# Patient Record
Sex: FEMALE | Race: BLACK OR AFRICAN AMERICAN | NOT HISPANIC OR LATINO
[De-identification: names, ages, dates, MRNs, and addresses within clinical notes are randomized per-mention and may not be internally consistent; named-entity substitution may affect disease eponyms.]

---

## 2017-06-27 ENCOUNTER — RX RENEWAL (OUTPATIENT)
Age: 32
End: 2017-06-27

## 2017-07-13 ENCOUNTER — LABORATORY RESULT (OUTPATIENT)
Age: 32
End: 2017-07-13

## 2017-07-13 ENCOUNTER — APPOINTMENT (OUTPATIENT)
Dept: INTERNAL MEDICINE | Facility: CLINIC | Age: 32
End: 2017-07-13

## 2017-07-13 VITALS
DIASTOLIC BLOOD PRESSURE: 82 MMHG | WEIGHT: 265 LBS | OXYGEN SATURATION: 99 % | RESPIRATION RATE: 14 BRPM | HEIGHT: 64 IN | BODY MASS INDEX: 45.24 KG/M2 | HEART RATE: 66 BPM | SYSTOLIC BLOOD PRESSURE: 120 MMHG

## 2017-07-13 DIAGNOSIS — K21.9 GASTRO-ESOPHAGEAL REFLUX DISEASE W/OUT ESOPHAGITIS: ICD-10-CM

## 2017-07-17 ENCOUNTER — MESSAGE (OUTPATIENT)
Age: 32
End: 2017-07-17

## 2017-07-18 LAB
C TRACH RRNA SPEC QL NAA+PROBE: NORMAL
HBV SURFACE AB SER QL: NONREACTIVE
HBV SURFACE AG SER QL: NONREACTIVE
HCV AB SER QL: NONREACTIVE
HCV S/CO RATIO: 0.07 S/CO
HIV1+2 AB SPEC QL IA.RAPID: REACTIVE
N GONORRHOEA RRNA SPEC QL NAA+PROBE: NORMAL
SOURCE AMPLIFICATION: NORMAL
T PALLIDUM AB SER QL IA: NEGATIVE

## 2017-08-04 ENCOUNTER — RX RENEWAL (OUTPATIENT)
Age: 32
End: 2017-08-04

## 2018-03-01 ENCOUNTER — RX RENEWAL (OUTPATIENT)
Age: 33
End: 2018-03-01

## 2018-05-07 ENCOUNTER — APPOINTMENT (OUTPATIENT)
Dept: INTERNAL MEDICINE | Facility: CLINIC | Age: 33
End: 2018-05-07
Payer: COMMERCIAL

## 2018-05-07 VITALS
SYSTOLIC BLOOD PRESSURE: 120 MMHG | TEMPERATURE: 98.6 F | RESPIRATION RATE: 14 BRPM | HEART RATE: 69 BPM | BODY MASS INDEX: 46.95 KG/M2 | HEIGHT: 64 IN | DIASTOLIC BLOOD PRESSURE: 80 MMHG | WEIGHT: 275 LBS | OXYGEN SATURATION: 97 %

## 2018-05-07 PROCEDURE — 99395 PREV VISIT EST AGE 18-39: CPT | Mod: 25

## 2018-05-07 PROCEDURE — 36415 COLL VENOUS BLD VENIPUNCTURE: CPT

## 2018-05-07 RX ORDER — CIPROFLOXACIN HYDROCHLORIDE 500 MG/1
500 TABLET, FILM COATED ORAL
Qty: 10 | Refills: 0 | Status: DISCONTINUED | COMMUNITY
Start: 2017-03-08 | End: 2018-05-07

## 2018-05-07 RX ORDER — ACETAZOLAMIDE 500 MG/1
500 CAPSULE ORAL
Qty: 90 | Refills: 0 | Status: DISCONTINUED | COMMUNITY
Start: 2017-02-02 | End: 2018-05-07

## 2018-05-07 RX ORDER — FLUCONAZOLE 150 MG/1
150 TABLET ORAL
Qty: 1 | Refills: 0 | Status: DISCONTINUED | COMMUNITY
Start: 2017-08-04 | End: 2018-05-07

## 2018-05-07 RX ORDER — ACETAZOLAMIDE 500 MG/1
500 CAPSULE ORAL
Refills: 0 | Status: DISCONTINUED | COMMUNITY
End: 2018-05-07

## 2018-05-08 LAB
ALBUMIN SERPL ELPH-MCNC: 4.6 G/DL
ALP BLD-CCNC: 48 U/L
ALT SERPL-CCNC: 10 U/L
ANION GAP SERPL CALC-SCNC: 14 MMOL/L
AST SERPL-CCNC: 14 U/L
BASOPHILS # BLD AUTO: 0.01 K/UL
BASOPHILS NFR BLD AUTO: 0.2 %
BILIRUB SERPL-MCNC: 0.2 MG/DL
BUN SERPL-MCNC: 13 MG/DL
CALCIUM SERPL-MCNC: 9.5 MG/DL
CHLORIDE SERPL-SCNC: 104 MMOL/L
CHOLEST SERPL-MCNC: 221 MG/DL
CHOLEST/HDLC SERPL: 3.6 RATIO
CO2 SERPL-SCNC: 20 MMOL/L
CREAT SERPL-MCNC: 0.95 MG/DL
EOSINOPHIL # BLD AUTO: 0.11 K/UL
EOSINOPHIL NFR BLD AUTO: 2.3 %
GLUCOSE SERPL-MCNC: 108 MG/DL
HBA1C MFR BLD HPLC: 6.2 %
HCT VFR BLD CALC: 37.5 %
HDLC SERPL-MCNC: 61 MG/DL
HGB BLD-MCNC: 10.9 G/DL
IMM GRANULOCYTES NFR BLD AUTO: 0.2 %
LDLC SERPL CALC-MCNC: 147 MG/DL
LYMPHOCYTES # BLD AUTO: 2.07 K/UL
LYMPHOCYTES NFR BLD AUTO: 42.5 %
MAN DIFF?: NORMAL
MCHC RBC-ENTMCNC: 23.6 PG
MCHC RBC-ENTMCNC: 29.1 GM/DL
MCV RBC AUTO: 81.3 FL
MONOCYTES # BLD AUTO: 0.47 K/UL
MONOCYTES NFR BLD AUTO: 9.7 %
NEUTROPHILS # BLD AUTO: 2.2 K/UL
NEUTROPHILS NFR BLD AUTO: 45.1 %
PLATELET # BLD AUTO: 334 K/UL
POTASSIUM SERPL-SCNC: 4 MMOL/L
PROT SERPL-MCNC: 7.4 G/DL
RBC # BLD: 4.61 M/UL
RBC # FLD: 18 %
SODIUM SERPL-SCNC: 138 MMOL/L
TRIGL SERPL-MCNC: 64 MG/DL
WBC # FLD AUTO: 4.87 K/UL

## 2018-06-18 RX ORDER — FAMOTIDINE 20 MG/1
20 TABLET, FILM COATED ORAL DAILY
Qty: 30 | Refills: 5 | Status: DISCONTINUED | COMMUNITY
Start: 2018-05-14 | End: 2018-06-18

## 2018-06-18 RX ORDER — RANITIDINE 150 MG/1
150 TABLET ORAL
Qty: 60 | Refills: 11 | Status: DISCONTINUED | COMMUNITY
Start: 2018-05-07 | End: 2018-06-18

## 2018-06-29 ENCOUNTER — MESSAGE (OUTPATIENT)
Age: 33
End: 2018-06-29

## 2019-01-11 ENCOUNTER — RX RENEWAL (OUTPATIENT)
Age: 34
End: 2019-01-11

## 2019-05-17 ENCOUNTER — APPOINTMENT (OUTPATIENT)
Dept: INTERNAL MEDICINE | Facility: CLINIC | Age: 34
End: 2019-05-17
Payer: COMMERCIAL

## 2019-05-17 VITALS
HEART RATE: 68 BPM | DIASTOLIC BLOOD PRESSURE: 90 MMHG | BODY MASS INDEX: 46.78 KG/M2 | TEMPERATURE: 98.4 F | SYSTOLIC BLOOD PRESSURE: 132 MMHG | HEIGHT: 64 IN | WEIGHT: 274 LBS

## 2019-05-17 DIAGNOSIS — J30.2 OTHER SEASONAL ALLERGIC RHINITIS: ICD-10-CM

## 2019-05-17 PROCEDURE — 36415 COLL VENOUS BLD VENIPUNCTURE: CPT

## 2019-05-17 PROCEDURE — 99395 PREV VISIT EST AGE 18-39: CPT

## 2019-05-17 NOTE — PHYSICAL EXAM
[No Acute Distress] : no acute distress [Well Nourished] : well nourished [Well Developed] : well developed [Normal Sclera/Conjunctiva] : normal sclera/conjunctiva [Well-Appearing] : well-appearing [PERRL] : pupils equal round and reactive to light [EOMI] : extraocular movements intact [Normal Outer Ear/Nose] : the outer ears and nose were normal in appearance [Normal Oropharynx] : the oropharynx was normal [No JVD] : no jugular venous distention [No Lymphadenopathy] : no lymphadenopathy [Supple] : supple [Thyroid Normal, No Nodules] : the thyroid was normal and there were no nodules present [Clear to Auscultation] : lungs were clear to auscultation bilaterally [No Respiratory Distress] : no respiratory distress  [No Accessory Muscle Use] : no accessory muscle use [Normal Rate] : normal rate  [Regular Rhythm] : with a regular rhythm [Normal S1, S2] : normal S1 and S2 [No Murmur] : no murmur heard [Pedal Pulses Present] : the pedal pulses are present [No Edema] : there was no peripheral edema [Non Tender] : non-tender [Soft] : abdomen soft [Non-distended] : non-distended [No Masses] : no abdominal mass palpated [No HSM] : no HSM [Normal Bowel Sounds] : normal bowel sounds [Normal Posterior Cervical Nodes] : no posterior cervical lymphadenopathy [Normal Anterior Cervical Nodes] : no anterior cervical lymphadenopathy [No CVA Tenderness] : no CVA  tenderness [No Spinal Tenderness] : no spinal tenderness [No Joint Swelling] : no joint swelling [Grossly Normal Strength/Tone] : grossly normal strength/tone [No Rash] : no rash [Coordination Grossly Intact] : coordination grossly intact [Normal Gait] : normal gait [No Focal Deficits] : no focal deficits [Normal Affect] : the affect was normal [Normal Insight/Judgement] : insight and judgment were intact

## 2019-05-18 NOTE — HISTORY OF PRESENT ILLNESS
[FreeTextEntry1] : wellness [de-identified] : Saw a dietician two weeks ago\par has seasonal allergies.\par seeing ortho soon for right knee pain.  \par weight 269 lbs at home.  \par rpt bp 128/88 - remains on labetalol\par Going to [place] in December - needs yellow fever.    -tetanus in 2010.

## 2019-05-18 NOTE — PLAN
[FreeTextEntry1] : Jamie complete\par  labs today\par  BP elevated - Needs to lose weight - reviewed this, she milena work on diet and exercise - f/up in 3 months to monitor progress\par Continue labetalol

## 2019-05-19 LAB
ALBUMIN SERPL ELPH-MCNC: 4.6 G/DL
ALP BLD-CCNC: 42 U/L
ALT SERPL-CCNC: 14 U/L
ANION GAP SERPL CALC-SCNC: 10 MMOL/L
AST SERPL-CCNC: 15 U/L
BASOPHILS # BLD AUTO: 0.05 K/UL
BASOPHILS NFR BLD AUTO: 1 %
BILIRUB SERPL-MCNC: 0.3 MG/DL
BUN SERPL-MCNC: 16 MG/DL
CALCIUM SERPL-MCNC: 9.4 MG/DL
CHLORIDE SERPL-SCNC: 112 MMOL/L
CHOLEST SERPL-MCNC: 216 MG/DL
CHOLEST/HDLC SERPL: 4 RATIO
CO2 SERPL-SCNC: 20 MMOL/L
CREAT SERPL-MCNC: 0.92 MG/DL
EOSINOPHIL # BLD AUTO: 0.11 K/UL
EOSINOPHIL NFR BLD AUTO: 2.3 %
ESTIMATED AVERAGE GLUCOSE: 123 MG/DL
FERRITIN SERPL-MCNC: 28 NG/ML
GLUCOSE SERPL-MCNC: 100 MG/DL
HBA1C MFR BLD HPLC: 5.9 %
HCT VFR BLD CALC: 41 %
HDLC SERPL-MCNC: 54 MG/DL
HGB BLD-MCNC: 12.7 G/DL
IMM GRANULOCYTES NFR BLD AUTO: 0.4 %
IRON SATN MFR SERPL: 19 %
IRON SERPL-MCNC: 67 UG/DL
LDLC SERPL CALC-MCNC: 146 MG/DL
LYMPHOCYTES # BLD AUTO: 2.44 K/UL
LYMPHOCYTES NFR BLD AUTO: 50.3 %
MAN DIFF?: NORMAL
MCHC RBC-ENTMCNC: 26.9 PG
MCHC RBC-ENTMCNC: 31 GM/DL
MCV RBC AUTO: 86.9 FL
MONOCYTES # BLD AUTO: 0.38 K/UL
MONOCYTES NFR BLD AUTO: 7.8 %
NEUTROPHILS # BLD AUTO: 1.85 K/UL
NEUTROPHILS NFR BLD AUTO: 38.2 %
PLATELET # BLD AUTO: 250 K/UL
POTASSIUM SERPL-SCNC: 4.1 MMOL/L
PROT SERPL-MCNC: 6.9 G/DL
RBC # BLD: 4.72 M/UL
RBC # FLD: 16.4 %
SODIUM SERPL-SCNC: 142 MMOL/L
TIBC SERPL-MCNC: 353 UG/DL
TRIGL SERPL-MCNC: 80 MG/DL
UIBC SERPL-MCNC: 286 UG/DL
WBC # FLD AUTO: 4.85 K/UL

## 2019-06-03 ENCOUNTER — MESSAGE (OUTPATIENT)
Age: 34
End: 2019-06-03

## 2019-06-18 ENCOUNTER — APPOINTMENT (OUTPATIENT)
Dept: INTERNAL MEDICINE | Facility: CLINIC | Age: 34
End: 2019-06-18
Payer: COMMERCIAL

## 2019-06-18 VITALS
BODY MASS INDEX: 46.78 KG/M2 | HEART RATE: 67 BPM | RESPIRATION RATE: 14 BRPM | TEMPERATURE: 97 F | OXYGEN SATURATION: 98 % | WEIGHT: 274 LBS | SYSTOLIC BLOOD PRESSURE: 110 MMHG | HEIGHT: 64 IN | DIASTOLIC BLOOD PRESSURE: 70 MMHG

## 2019-06-18 PROCEDURE — 90471 IMMUNIZATION ADMIN: CPT

## 2019-06-18 PROCEDURE — 99212 OFFICE O/P EST SF 10 MIN: CPT | Mod: 25

## 2019-06-18 PROCEDURE — 90746 HEPB VACCINE 3 DOSE ADULT IM: CPT

## 2019-06-18 NOTE — PLAN
[FreeTextEntry1] : Hep B #1 today -will plan for complete series\par \par  Her bp remains controlled on bid labetalol

## 2019-08-02 ENCOUNTER — APPOINTMENT (OUTPATIENT)
Dept: INTERNAL MEDICINE | Facility: CLINIC | Age: 34
End: 2019-08-02
Payer: COMMERCIAL

## 2019-08-02 VITALS
TEMPERATURE: 98.9 F | RESPIRATION RATE: 14 BRPM | OXYGEN SATURATION: 98 % | HEART RATE: 78 BPM | SYSTOLIC BLOOD PRESSURE: 110 MMHG | DIASTOLIC BLOOD PRESSURE: 68 MMHG

## 2019-08-02 PROCEDURE — 90746 HEPB VACCINE 3 DOSE ADULT IM: CPT

## 2019-08-02 PROCEDURE — 99212 OFFICE O/P EST SF 10 MIN: CPT | Mod: 25

## 2019-08-02 PROCEDURE — 90471 IMMUNIZATION ADMIN: CPT

## 2019-09-11 ENCOUNTER — APPOINTMENT (OUTPATIENT)
Dept: INTERNAL MEDICINE | Facility: CLINIC | Age: 34
End: 2019-09-11

## 2019-10-13 ENCOUNTER — RX RENEWAL (OUTPATIENT)
Age: 34
End: 2019-10-13

## 2019-11-15 ENCOUNTER — APPOINTMENT (OUTPATIENT)
Dept: INTERNAL MEDICINE | Facility: CLINIC | Age: 34
End: 2019-11-15
Payer: COMMERCIAL

## 2019-11-15 VITALS
OXYGEN SATURATION: 98 % | TEMPERATURE: 98 F | WEIGHT: 270 LBS | HEART RATE: 69 BPM | BODY MASS INDEX: 46.1 KG/M2 | SYSTOLIC BLOOD PRESSURE: 110 MMHG | RESPIRATION RATE: 14 BRPM | HEIGHT: 64 IN | DIASTOLIC BLOOD PRESSURE: 70 MMHG

## 2019-11-15 DIAGNOSIS — E66.01 MORBID (SEVERE) OBESITY DUE TO EXCESS CALORIES: ICD-10-CM

## 2019-11-15 PROCEDURE — 90471 IMMUNIZATION ADMIN: CPT

## 2019-11-15 PROCEDURE — 90632 HEPA VACCINE ADULT IM: CPT

## 2019-11-15 PROCEDURE — 90472 IMMUNIZATION ADMIN EACH ADD: CPT

## 2019-11-15 PROCEDURE — 99213 OFFICE O/P EST LOW 20 MIN: CPT | Mod: 25

## 2019-11-15 PROCEDURE — 90746 HEPB VACCINE 3 DOSE ADULT IM: CPT

## 2019-11-15 NOTE — HISTORY OF PRESENT ILLNESS
[de-identified] : Here for Hep A and B\par  needs labs for school\par  Concerned about her weight- working diet and exercise

## 2019-11-16 LAB
25(OH)D3 SERPL-MCNC: 24.7 NG/ML
ANION GAP SERPL CALC-SCNC: 14 MMOL/L
BUN SERPL-MCNC: 19 MG/DL
CALCIUM SERPL-MCNC: 9.8 MG/DL
CHLORIDE SERPL-SCNC: 111 MMOL/L
CO2 SERPL-SCNC: 18 MMOL/L
CREAT SERPL-MCNC: 1.05 MG/DL
ESTIMATED AVERAGE GLUCOSE: 128 MG/DL
FERRITIN SERPL-MCNC: 17 NG/ML
FOLATE SERPL-MCNC: 7.3 NG/ML
GLUCOSE SERPL-MCNC: 96 MG/DL
HBA1C MFR BLD HPLC: 6.1 %
IRON SATN MFR SERPL: 12 %
IRON SERPL-MCNC: 50 UG/DL
POTASSIUM SERPL-SCNC: 3.9 MMOL/L
SODIUM SERPL-SCNC: 143 MMOL/L
TIBC SERPL-MCNC: 403 UG/DL
UIBC SERPL-MCNC: 353 UG/DL
VIT B12 SERPL-MCNC: 316 PG/ML

## 2020-04-30 ENCOUNTER — APPOINTMENT (OUTPATIENT)
Dept: INTERNAL MEDICINE | Facility: CLINIC | Age: 35
End: 2020-04-30
Payer: COMMERCIAL

## 2020-04-30 PROCEDURE — 99442: CPT

## 2020-05-05 ENCOUNTER — APPOINTMENT (OUTPATIENT)
Dept: NEUROLOGY | Facility: CLINIC | Age: 35
End: 2020-05-05
Payer: COMMERCIAL

## 2020-05-05 DIAGNOSIS — R42 DIZZINESS AND GIDDINESS: ICD-10-CM

## 2020-05-05 PROCEDURE — 99213 OFFICE O/P EST LOW 20 MIN: CPT | Mod: 95

## 2020-05-05 NOTE — HISTORY OF PRESENT ILLNESS
[Other Location: e.g. School (Enter Location, City,State)___] : at [unfilled], at the time of the visit. [Patient] : the patient [Other Location: e.g. 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

## 2020-05-21 ENCOUNTER — APPOINTMENT (OUTPATIENT)
Dept: NEUROLOGY | Facility: CLINIC | Age: 35
End: 2020-05-21
Payer: COMMERCIAL

## 2020-05-21 DIAGNOSIS — R41.3 OTHER AMNESIA: ICD-10-CM

## 2020-05-21 PROCEDURE — 99213 OFFICE O/P EST LOW 20 MIN: CPT | Mod: 95

## 2020-05-21 NOTE — HISTORY OF PRESENT ILLNESS
[Other Location: e.g. Home (Enter Location, City,State)___] : at [unfilled] [Verbal consent obtained from patient] : the patient, [unfilled] [FreeTextEntry1] : 34 year old - COVID with headaches memory loss and visual symptoms - she was seen by opthalmology -history of benign intracranial hypotension - short term memory- eyes bother her with eye movement\par  No new numbness or tingling in hands or feet .\par \par Awake and alert\par  Full EOM\par  Motor - no drift\par  No foot drop\par  No tremors or dysmetria\par  Full ROM of the neck \par \par R/O CNS covid\par  MRI head, EEG, Neuropsych testing and blood

## 2020-06-11 ENCOUNTER — APPOINTMENT (OUTPATIENT)
Dept: NEUROLOGY | Facility: CLINIC | Age: 35
End: 2020-06-11
Payer: COMMERCIAL

## 2020-06-11 DIAGNOSIS — G93.89 OTHER SPECIFIED DISORDERS OF BRAIN: ICD-10-CM

## 2020-06-11 PROCEDURE — 99212 OFFICE O/P EST SF 10 MIN: CPT | Mod: 95

## 2020-06-11 NOTE — HISTORY OF PRESENT ILLNESS
[Home] : at home, [unfilled] , at the time of the visit. [Other Location: e.g. Home (Enter Location, City,State)___] : at [unfilled] [Verbal consent obtained from patient] : the patient, [unfilled] [FreeTextEntry1] : 34 year old - COVID episodes of lightheaded and dizziness - visual symptoms - no memory loss - chest pain - SOB \par MRI head- calcification vs meningioma \par \par Awake and alert \par  Full EOM\par  Motor - no drift\par  No foot drop\par  Negative Romberg \par  No tremors or dysmetria \par \par Lightheaded - r/o arrhythmia\par \par  FU with Dr Mackey cardiology\par  EEG and blood \par Dr Perales Neuro surgery \par

## 2020-06-23 PROBLEM — G93.2 IDIOPATHIC INTRACRANIAL HYPERTENSION: Status: RESOLVED | Noted: 2020-06-23 | Resolved: 2020-06-23

## 2020-06-23 RX ORDER — VALACYCLOVIR 1 G/1
1 TABLET, FILM COATED ORAL
Qty: 5 | Refills: 0 | Status: DISCONTINUED | COMMUNITY
Start: 2016-10-17 | End: 2020-06-23

## 2020-06-23 RX ORDER — AZITHROMYCIN 250 MG/1
250 TABLET, FILM COATED ORAL
Qty: 6 | Refills: 0 | Status: DISCONTINUED | COMMUNITY
Start: 2019-11-15 | End: 2020-06-23

## 2020-06-23 RX ORDER — ATOVAQUONE AND PROGUANIL HYDROCHLORIDE 250; 100 MG/1; MG/1
250-100 TABLET, FILM COATED ORAL DAILY
Qty: 20 | Refills: 0 | Status: DISCONTINUED | COMMUNITY
Start: 2019-11-15 | End: 2020-06-23

## 2020-06-23 RX ORDER — AZELASTINE HYDROCHLORIDE 0.5 MG/ML
0.05 SOLUTION/ DROPS OPHTHALMIC
Qty: 1 | Refills: 5 | Status: DISCONTINUED | COMMUNITY
Start: 2019-05-17 | End: 2020-06-23

## 2020-06-23 RX ORDER — BETAMETHASONE DIPROPIONATE 0.5 MG/G
0.05 OINTMENT TOPICAL
Refills: 0 | Status: DISCONTINUED | COMMUNITY
End: 2020-06-23

## 2020-06-25 ENCOUNTER — LABORATORY RESULT (OUTPATIENT)
Age: 35
End: 2020-06-25

## 2020-06-25 ENCOUNTER — APPOINTMENT (OUTPATIENT)
Dept: NEUROLOGY | Facility: CLINIC | Age: 35
End: 2020-06-25
Payer: COMMERCIAL

## 2020-06-25 PROCEDURE — 95819 EEG AWAKE AND ASLEEP: CPT

## 2020-06-26 ENCOUNTER — APPOINTMENT (OUTPATIENT)
Dept: NEUROSURGERY | Facility: CLINIC | Age: 35
End: 2020-06-26
Payer: COMMERCIAL

## 2020-06-26 VITALS
HEART RATE: 74 BPM | SYSTOLIC BLOOD PRESSURE: 119 MMHG | BODY MASS INDEX: 46.1 KG/M2 | TEMPERATURE: 98.6 F | OXYGEN SATURATION: 99 % | DIASTOLIC BLOOD PRESSURE: 77 MMHG | HEIGHT: 64 IN | RESPIRATION RATE: 18 BRPM | WEIGHT: 270 LBS

## 2020-06-26 DIAGNOSIS — Z80.9 FAMILY HISTORY OF MALIGNANT NEOPLASM, UNSPECIFIED: ICD-10-CM

## 2020-06-26 DIAGNOSIS — Z82.61 FAMILY HISTORY OF ARTHRITIS: ICD-10-CM

## 2020-06-26 DIAGNOSIS — Z86.79 PERSONAL HISTORY OF OTHER DISEASES OF THE CIRCULATORY SYSTEM: ICD-10-CM

## 2020-06-26 DIAGNOSIS — Z82.49 FAMILY HISTORY OF ISCHEMIC HEART DISEASE AND OTHER DISEASES OF THE CIRCULATORY SYSTEM: ICD-10-CM

## 2020-06-26 DIAGNOSIS — Z86.59 PERSONAL HISTORY OF OTHER MENTAL AND BEHAVIORAL DISORDERS: ICD-10-CM

## 2020-06-26 DIAGNOSIS — G93.2 BENIGN INTRACRANIAL HYPERTENSION: ICD-10-CM

## 2020-06-26 LAB
ALBUMIN MFR SERPL ELPH: 62.8 %
ALBUMIN SERPL-MCNC: 4.5 G/DL
ALBUMIN/GLOB SERPL: 1.7 RATIO
ALPHA1 GLOB MFR SERPL ELPH: 3.2 %
ALPHA1 GLOB SERPL ELPH-MCNC: 0.2 G/DL
ALPHA2 GLOB MFR SERPL ELPH: 8.5 %
ALPHA2 GLOB SERPL ELPH-MCNC: 0.6 G/DL
B BURGDOR IGG+IGM SER QL IB: NORMAL
B-GLOBULIN MFR SERPL ELPH: 10.7 %
B-GLOBULIN SERPL ELPH-MCNC: 0.8 G/DL
CK SERPL-CCNC: 124 U/L
ENA SS-A AB SER IA-ACNC: 0.9 AL
ENA SS-B AB SER IA-ACNC: <0.2 AL
ERYTHROCYTE [SEDIMENTATION RATE] IN BLOOD BY WESTERGREN METHOD: 15 MM/HR
FOLATE SERPL-MCNC: 15.2 NG/ML
GAMMA GLOB FLD ELPH-MCNC: 1.1 G/DL
GAMMA GLOB MFR SERPL ELPH: 14.8 %
INTERPRETATION SERPL IEP-IMP: NORMAL
PROT SERPL-MCNC: 7.2 G/DL
PROT SERPL-MCNC: 7.2 G/DL
TSH SERPL-ACNC: 1.66 UIU/ML
VIT B12 SERPL-MCNC: 316 PG/ML

## 2020-06-26 PROCEDURE — 99205 OFFICE O/P NEW HI 60 MIN: CPT

## 2020-06-26 RX ORDER — CHROMIUM 200 MCG
TABLET ORAL
Refills: 0 | Status: ACTIVE | COMMUNITY

## 2020-06-26 RX ORDER — FAMOTIDINE 20 MG/1
20 TABLET, FILM COATED ORAL
Refills: 0 | Status: ACTIVE | COMMUNITY

## 2020-06-26 RX ORDER — MV-MIN/FOLIC/VIT K/LYCOP/COQ10 200-100MCG
CAPSULE ORAL
Refills: 0 | Status: ACTIVE | COMMUNITY

## 2020-06-26 NOTE — PLAN
[FreeTextEntry1] : MRI brain without contrast from 6/82020 reviewed demonstrates possible left frontal meningioma.\par Recommend MRI brain with and without contrast for further evaluation.\par Education provided regarding disease process and plan of care.\par \par Patient verbalizes agreement and understanding with plan of care.

## 2020-06-26 NOTE — DATA REVIEWED
[de-identified] : MRI brain without contrast 6/8/2020:\par Impression:\par 1. NO acute intracranial pathology or intracranial hemorrhage\par 2. 0.8 cm osteophytic ridge inner table bone high left posterior frontal convexity versus possible densely calcified meningioma

## 2020-06-26 NOTE — PHYSICAL EXAM
[General Appearance - Alert] : alert [General Appearance - In No Acute Distress] : in no acute distress [Oriented To Time, Place, And Person] : oriented to person, place, and time [Cranial Nerves Optic (II)] : visual acuity intact bilaterally,  pupils equal round and reactive to light [Cranial Nerves Oculomotor (III)] : extraocular motion intact [Cranial Nerves Trigeminal (V)] : facial sensation intact symmetrically [Cranial Nerves Facial (VII)] : face symmetrical [Cranial Nerves Vestibulocochlear (VIII)] : hearing was intact bilaterally [Cranial Nerves Glossopharyngeal (IX)] : tongue and palate midline [Cranial Nerves Accessory (XI - Cranial And Spinal)] : head turning and shoulder shrug symmetric [Cranial Nerves Hypoglossal (XII)] : there was no tongue deviation with protrusion [Motor Tone] : muscle tone was normal in all four extremities [Motor Strength] : muscle strength was normal in all four extremities [Sclera] : the sclera and conjunctiva were normal [Outer Ear] : the ears and nose were normal in appearance [Neck Appearance] : the appearance of the neck was normal [] : no respiratory distress [Abnormal Walk] : normal gait [Skin Color & Pigmentation] : normal skin color and pigmentation

## 2020-06-26 NOTE — HISTORY OF PRESENT ILLNESS
[de-identified] : \par 34 year old woman with past medical history HTN, IIH (on diamox - diagnosed approximately 5 years ago) referred by Dr. Atul Avalos for neurosurgical evaluation of calcified meningioma. \par \par Ms. Peres states she was diagnosed with COVID in March 2020 and was sick for approximately 3 weeks. Post-covid she developed symptoms including difficulty focusing, dizziness/lightheadedness, tingling in bilateral toes at night as well as dull headaches. She reports one syncopal episode in which she was in her car and woke up and did not recall events. She sought emergency care twice for these symptoms.\par \par She talked to her PCP, Dr. Rodriguez who referred her to neurology, Dr. Avalos. MRI brain without contrast was done on 6/9/2020 which demonstrated  0.8 cm left frontal convexity calcified meningioma. \par \par Ms. Peres states she also has a history of idiopathic intracranial HTN for which she takes diamox. She states she was diagnosed approximately 5 years ago when she developed a blind spot in her LEFT eye. She saw a neuro ophthalmologist who she sees regularly. Five years ago, she was noted to have papilledema and undeerwent LP in which her opening pressure was 44. She was diagnosed with IIH, started on diamox and her vision has improved. She saw her ophthalmologist about 3 weeks ago due to new neurological symptoms and her exam was stable.\par \par

## 2020-06-29 LAB — CARDIOLIPIN AB SER IA-ACNC: NEGATIVE

## 2020-07-20 ENCOUNTER — APPOINTMENT (OUTPATIENT)
Dept: INTERNAL MEDICINE | Facility: CLINIC | Age: 35
End: 2020-07-20
Payer: COMMERCIAL

## 2020-07-20 VITALS
HEART RATE: 81 BPM | OXYGEN SATURATION: 98 % | WEIGHT: 270 LBS | HEIGHT: 64 IN | TEMPERATURE: 96.8 F | DIASTOLIC BLOOD PRESSURE: 78 MMHG | BODY MASS INDEX: 46.1 KG/M2 | SYSTOLIC BLOOD PRESSURE: 120 MMHG

## 2020-07-20 DIAGNOSIS — I10 ESSENTIAL (PRIMARY) HYPERTENSION: ICD-10-CM

## 2020-07-20 PROCEDURE — 36415 COLL VENOUS BLD VENIPUNCTURE: CPT

## 2020-07-20 PROCEDURE — 99395 PREV VISIT EST AGE 18-39: CPT | Mod: 25

## 2020-07-20 PROCEDURE — 90471 IMMUNIZATION ADMIN: CPT

## 2020-07-20 PROCEDURE — 90715 TDAP VACCINE 7 YRS/> IM: CPT

## 2020-07-20 RX ORDER — ESCITALOPRAM OXALATE 10 MG/1
10 TABLET ORAL
Qty: 30 | Refills: 5 | Status: ACTIVE | COMMUNITY
Start: 2020-07-20 | End: 1900-01-01

## 2020-07-20 RX ORDER — ALPRAZOLAM 0.25 MG/1
0.25 TABLET ORAL
Qty: 2 | Refills: 0 | Status: DISCONTINUED | COMMUNITY
Start: 2020-07-16 | End: 2020-07-20

## 2020-07-20 NOTE — HISTORY OF PRESENT ILLNESS
[FreeTextEntry1] : Wellness [de-identified] : 33 yo f with hx of HTN, Idiopathic intracranial HTN on diamox, obesity. Had COvid and developed dizziness and syncope post illness = seen by neurology\par Currently being evaluated for possible meningioma\par Follows with neuro-ophtho as well.\grayson Has been having ups and downs.  Her symptoms overall have significantly improved. Still having TOBIAS but also improving.  Walking more- taking the subway which is helping.  \par Now dealing with anxiety.- Not sleeping.  Wakes up multiple times and having nightmares.    Seeing a theraist weekly - via telemed.  Using headspace, using a weighted blanket. Joined a nurse support group.    Has been scared to drive.  Still no highways.  Lives alone and symptoms worse when alone.    Denies SI or HI.  \par Tried melatonin but falling asleep is not the issue, just trouble stayig asleep.

## 2020-07-20 NOTE — PHYSICAL EXAM
[No Edema] : there was no peripheral edema [Coordination Grossly Intact] : coordination grossly intact [Normal] : affect was normal and insight and judgment were intact

## 2020-07-20 NOTE — PLAN
[FreeTextEntry1] : \par \par COvid  Dyspnea- - likely deconditioning\par \par Tdap for today\par \par Check vit D level - \par \par Anxiety- discussed in detail - option -continue therapy\par  trial of lexapro 10mg \par f/up for TEB in 2 weeks.\par \par Alopeccia- extra labs per derm

## 2020-07-24 ENCOUNTER — APPOINTMENT (OUTPATIENT)
Dept: NEUROSURGERY | Facility: CLINIC | Age: 35
End: 2020-07-24
Payer: COMMERCIAL

## 2020-07-24 DIAGNOSIS — D32.9 BENIGN NEOPLASM OF MENINGES, UNSPECIFIED: ICD-10-CM

## 2020-07-24 LAB
25(OH)D3 SERPL-MCNC: 21.8 NG/ML
ALBUMIN SERPL ELPH-MCNC: 4.8 G/DL
ALP BLD-CCNC: 42 U/L
ALT SERPL-CCNC: 13 U/L
ANA SER IF-ACNC: NEGATIVE
ANION GAP SERPL CALC-SCNC: 16 MMOL/L
AST SERPL-CCNC: 17 U/L
BASOPHILS # BLD AUTO: 0.02 K/UL
BASOPHILS NFR BLD AUTO: 0.5 %
BILIRUB SERPL-MCNC: 0.3 MG/DL
BUN SERPL-MCNC: 15 MG/DL
CALCIUM SERPL-MCNC: 9.5 MG/DL
CHLORIDE SERPL-SCNC: 106 MMOL/L
CHOLEST SERPL-MCNC: 229 MG/DL
CHOLEST/HDLC SERPL: 3.7 RATIO
CO2 SERPL-SCNC: 17 MMOL/L
CREAT SERPL-MCNC: 0.93 MG/DL
EOSINOPHIL # BLD AUTO: 0.06 K/UL
EOSINOPHIL NFR BLD AUTO: 1.4 %
ESTIMATED AVERAGE GLUCOSE: 120 MG/DL
GLUCOSE SERPL-MCNC: 104 MG/DL
HBA1C MFR BLD HPLC: 5.8 %
HCT VFR BLD CALC: 41.5 %
HDLC SERPL-MCNC: 62 MG/DL
HGB BLD-MCNC: 12.4 G/DL
IMM GRANULOCYTES NFR BLD AUTO: 0.5 %
LDLC SERPL CALC-MCNC: 148 MG/DL
LYMPHOCYTES # BLD AUTO: 1.97 K/UL
LYMPHOCYTES NFR BLD AUTO: 45.4 %
MAN DIFF?: NORMAL
MCHC RBC-ENTMCNC: 27.6 PG
MCHC RBC-ENTMCNC: 29.9 GM/DL
MCV RBC AUTO: 92.4 FL
MONOCYTES # BLD AUTO: 0.41 K/UL
MONOCYTES NFR BLD AUTO: 9.4 %
NEUTROPHILS # BLD AUTO: 1.86 K/UL
NEUTROPHILS NFR BLD AUTO: 42.8 %
PLATELET # BLD AUTO: 281 K/UL
POTASSIUM SERPL-SCNC: 4.1 MMOL/L
PROT SERPL-MCNC: 7 G/DL
RBC # BLD: 4.49 M/UL
RBC # FLD: 16.6 %
SODIUM SERPL-SCNC: 140 MMOL/L
T4 FREE SERPL-MCNC: 1.1 NG/DL
TESTOST SERPL-MCNC: 23.2 NG/DL
TRIGL SERPL-MCNC: 96 MG/DL
TSH SERPL-ACNC: 2.18 UIU/ML
WBC # FLD AUTO: 4.34 K/UL

## 2020-07-24 PROCEDURE — 99215 OFFICE O/P EST HI 40 MIN: CPT | Mod: 95

## 2020-07-25 PROBLEM — D32.9 MENINGIOMA: Status: ACTIVE | Noted: 2020-06-26

## 2020-07-27 LAB — DHEA-SULFATE, SERUM: 90 UG/DL

## 2020-07-27 NOTE — HISTORY OF PRESENT ILLNESS
[place] [place] : at the [place] located in  [Home] : at home, [unfilled] , at the time of the visit. [Verbal consent obtained from patient] : the patient, [unfilled] [de-identified] : \par 34 year old woman with past medical history HTN, IIH (on diamox - diagnosed approximately 5 years ago) referred by Dr. Atul Avalos for neurosurgical evaluation of calcified meningioma. \par \par Ms. Peres states she was diagnosed with COVID in March 2020 and was sick for approximately 3 weeks. Post-covid she developed symptoms including difficulty focusing, dizziness/lightheadedness, tingling in bilateral toes at night as well as dull headaches. She reports one syncopal episode in which she was in her car and woke up and did not recall events. She sought emergency care twice for these symptoms.\par \par She talked to her PCP, Dr. Rodriguez who referred her to neurology, Dr. Avalos. MRI brain without contrast was done on 6/9/2020 which demonstrated  0.8 cm left frontal convexity calcified meningioma. \par \par Ms. Peres states she also has a history of idiopathic intracranial HTN for which she takes diamox. She states she was diagnosed approximately 5 years ago when she developed a blind spot in her LEFT eye. She saw a neuro ophthalmologist who she sees regularly. Five years ago, she was noted to have papilledema and undeerwent LP in which her opening pressure was 44. She was diagnosed with IIH, started on diamox and her vision has improved. She saw her ophthalmologist about 3 weeks ago due to new neurological symptoms and her exam was stable.\par \par

## 2020-07-27 NOTE — ASSESSMENT
[FreeTextEntry1] : MRI brain with and without contrast from 7/17/20 reviewed by Dr. Perales, demonstrates small high left posterior calcified meningioma.\par Discussed this as incidental finding and unlikely related to neurological symptoms.\par Recommend MRI brain with and without contrast in one year to evaluate stability (July 2021). Patient will call in June 2021 to schedule.\par All questions answered to patient's satisfaction.\par \par Patient verbalizes agreement and understanding with plan.

## 2020-07-27 NOTE — ADDENDUM
[FreeTextEntry1] : 2020 \par  \par OFFICE FOLLOWUP NOTE \par  \par  \par Re:	Carie Kaufman  \par :	81 \par MRN:  [id number] \par  \par Ms. Kaufman is a 34-year-old woman who presents for followup regarding a possible left frontal meningioma in the convexity region. When I first evaluated her she did not have a contrast enhanced MRI. She now presents with a contrast brain MRI which in fact does show a nonenhancing 8 mm left frontal convexity lesion. It does not enhance. It does not create any mass effect on the brain. It has been interpreted by the radiologist as possibly being a calcified meningioma.  \par  \[facility] We discussed this with Ms. Kaufman. She understands that I do not believe it to be causing any neurological symptoms at this time, given that it does not impinge on the brain. In fact the etiology is not at all certain, and I have encouraged her to follow it with brain MRIs with contrast on an annual basis. Of note, Ms. Kaufman does have a history of pseudotumor and she has been effectively managing this with Diamox. She sees her neuroophthalmologist periodically, and in fact was told that she no longer has any issues with papilledema. \par  \par I have encouraged Ms. Kaufman to alert me if she experiences any new or worsening neurological symptoms, including but not limited to, sensory changes, motor weakness, or cognitive dysfunction. I will plan to see Ms. Kaufman again in a year, unless she has issues prior to that. \par  \par  \par  \par  \par Kristian Perales M.D. \[facility]  of Neurosurgery \[employer] [facility] Department of Neurosurgery \[facility] \par [address] \par [facility] [address] [facility] \[place] [address] \[facility] Tel: [phone] \[facility] Email:  [email or web address] \par  \par Dictated but not read. \par  \par JOVANNI/rlp [employer] #[id number] \[facility]  \[facility]  \[facility]

## 2020-07-27 NOTE — REASON FOR VISIT
[Follow-Up: _____] : a [unfilled] follow-up visit [FreeTextEntry1] : Returns to review MRI brain with and without contrast for further evaluation of meningioma.\par \par She reports intermittent dizziness, bilateral numbness/tingling in feet. Denies syncopal episodes.\par Reports dull headaches.

## 2020-08-04 ENCOUNTER — APPOINTMENT (OUTPATIENT)
Dept: INTERNAL MEDICINE | Facility: CLINIC | Age: 35
End: 2020-08-04
Payer: COMMERCIAL

## 2020-08-04 PROCEDURE — 99213 OFFICE O/P EST LOW 20 MIN: CPT | Mod: 95

## 2020-08-04 NOTE — HISTORY OF PRESENT ILLNESS
[Other Location: e.g. Home (Enter Location, City,State)___] : at [unfilled] [Home] : at home, [unfilled] , at the time of the visit. [Verbal consent obtained from patient] : the patient, [unfilled] [de-identified] : \par She is taking lexapro now for 2 weeks.  It caused a lot of yawning.  Last 48 hrs a little less.  Has laos felt more fatigued than usual.    She has not been as active recently.\par Libido has decreased.  She has been more calm though.  Still occasional feeling of impending doom while driving.  Seeing atherapist bessy.  Has appt with psychiatrist tomorrow.  \par She is s/p evaluation with Dr Perales.  Not concerned.- plan for repeat MRI.

## 2020-08-04 NOTE — PLAN
[FreeTextEntry1] : Anxiety - improved but with sig side effects from Lexapro\par  - await opinon from psychiatry tomorrow regarding w=switching to a diifferent medication.\par \par Lifestyle mnagement of anxiety and depression reviewed

## 2020-08-04 NOTE — REVIEW OF SYSTEMS
[Fever] : no fever [Chills] : no chills [Fatigue] : fatigue [Depression] : depression [Anxiety] : anxiety [Negative] : Respiratory

## 2020-10-20 ENCOUNTER — NON-APPOINTMENT (OUTPATIENT)
Age: 35
End: 2020-10-20

## 2020-11-05 ENCOUNTER — NON-APPOINTMENT (OUTPATIENT)
Age: 35
End: 2020-11-05

## 2020-12-15 ENCOUNTER — RX RENEWAL (OUTPATIENT)
Age: 35
End: 2020-12-15

## 2021-04-16 ENCOUNTER — NON-APPOINTMENT (OUTPATIENT)
Age: 36
End: 2021-04-16

## 2021-04-29 RX ORDER — AMLODIPINE BESYLATE 10 MG/1
10 TABLET ORAL
Qty: 30 | Refills: 5 | Status: DISCONTINUED | COMMUNITY
Start: 2021-03-15 | End: 2021-04-29

## 2021-04-30 RX ORDER — LISINOPRIL AND HYDROCHLOROTHIAZIDE TABLETS 10; 12.5 MG/1; MG/1
10-12.5 TABLET ORAL DAILY
Qty: 30 | Refills: 5 | Status: DISCONTINUED | COMMUNITY
Start: 2021-04-29 | End: 2021-04-30

## 2021-07-16 ENCOUNTER — APPOINTMENT (OUTPATIENT)
Dept: INTERNAL MEDICINE | Facility: CLINIC | Age: 36
End: 2021-07-16
Payer: COMMERCIAL

## 2021-07-16 VITALS
BODY MASS INDEX: 49.34 KG/M2 | OXYGEN SATURATION: 98 % | SYSTOLIC BLOOD PRESSURE: 118 MMHG | WEIGHT: 289 LBS | TEMPERATURE: 98 F | DIASTOLIC BLOOD PRESSURE: 80 MMHG | HEIGHT: 64 IN | RESPIRATION RATE: 16 BRPM | HEART RATE: 92 BPM

## 2021-07-16 DIAGNOSIS — E55.9 VITAMIN D DEFICIENCY, UNSPECIFIED: ICD-10-CM

## 2021-07-16 PROCEDURE — 99072 ADDL SUPL MATRL&STAF TM PHE: CPT

## 2021-07-16 PROCEDURE — 99395 PREV VISIT EST AGE 18-39: CPT

## 2021-07-16 PROCEDURE — 36415 COLL VENOUS BLD VENIPUNCTURE: CPT

## 2021-07-16 RX ORDER — LABETALOL HYDROCHLORIDE 200 MG/1
200 TABLET, FILM COATED ORAL TWICE DAILY
Qty: 45 | Refills: 1 | Status: DISCONTINUED | COMMUNITY
Start: 2020-03-23 | End: 2021-07-16

## 2021-07-16 NOTE — HISTORY OF PRESENT ILLNESS
[FreeTextEntry1] : Wellness [de-identified] : 34yo F with a hx of HTN, idiopathic intracranial HTN on diamox, obesity, alopecia and had COVID-19 (2020) with persistent brain fog and fatigue; presents to her yearly wellness check. \par \par Reports she has had a considerable Wt gain of ~20lbs on lexapro without changes in her diet or exercise. \par - Max wt gain: 297lb (usually 270 lbs)  \par - Now F/u with wt loss doctor who is weaning her off of lexapro (Now on 20mg), and added metformin 500mg BID and wellbutrin for wt loss, anxiety and depression. Yesterday's wt was 287lbs\par - Trying acupuncture for appetite control - She states is working, becomes full faster.\par - Diet: Bkfst: egg, coffee, some greens, lunch: salads vs left-overs, dinner: varies, protein + veggies+ starch. She does not cook, buys outside food everyday. \par - Exercise: once a week, started three weeks ago: Boxing around [employer] one hour with instructor, Denies CP, SOB, muscle pain. Feels engage, working in increasing weekly activity. \par \par HTN:\par - BP meds were changed: stopped labetalol, tried amlodipine which caused fluid retention, and did not control her bp (maintained 140/90, with highest 160/100). Currently on hydrochlorothiazide 25mg once daily.\par - In office BP rpt: 118/88 with large cuff\par - At home, most measures are ~140/80-90 checked BID, she uses regular cuff\par \par Anxiety and depression:\par - Continues seeing therapist weekly\par - waxing and weaning symptoms, fatigue is usually a problem\par - Currently on Lexapro 20mg and Wellbutrin\par - Today her mood is happy, she is meeting a friend  in [place] and is looking forward to it. \par - Fatigued started after getting COVID-19 last year, and persist till today, together with brain fog\par \par HCM:\par - Saw Ophthalmology last friday, stable, will be seeing him again in oct. \par [place]par - Takes multivitamin, black seed oil, tumeric, see morel, soursap leaf and B-complex/biotin for hair, VitD. \par - GERD: She tried Pepcid 40mg for three days while out of the city, and felt better control than with her current 20mg dose. Reports some lingering burning after coffee with 20mg dose.\par - Seeing derm regularly for allopecia, receiving steroid inj.\par - gyn: Will schedule soon, last seen a year ago. Hx of 3 small fibroids, only symptom is cramping with ovulation - takes 500mg Naproxen PRN + tylenol 1hr later x 2d. Periods last 5-6days, usually not heavy. Not on any birth control. Currently, not sexually active.

## 2021-07-16 NOTE — PLAN
[FreeTextEntry1] : Wt gain:\par - Continue f/u wt loss specialist, medications and accupuncture\par - Recommended some healthy and affordable meal plan options\par - Encouraged increased weekly exercise routine to at least 3x a week for at least 30min\par - Talked about some att home options and daily interventions such as stairs vs elevators.\par \par HTN:\par - Advised obtaining Large cuff for her personal machine, in office BP WNL\par - Continue Hydrochlorothiazide 25mg daily for now, if BP remains elevated with Large cuff, \par consider adding low dose Amlodipine (2.5mg)\par \par Anxiety and depression:\par - Continue weekly therapy and appropriate weaning off of lexapro. \par \par GERD:\par - Increased Famotidine 40mg, one tab as directed.\par \par HCM:\par - Routine labs today: A1C, CBC, CMP, lipid profile, TSH, UA. Declined STI testing. \par - GYN visit soon, ophthal f/u in Oct, and derm PRN\par - Planning to move to [facility] in Nov, will work on transfer of records as needed\par

## 2021-07-16 NOTE — REVIEW OF SYSTEMS
[Fatigue] : fatigue [Recent Change In Weight] : ~T recent weight change [Heartburn] : heartburn [Anxiety] : anxiety [Depression] : depression [Negative] : Musculoskeletal [Fever] : no fever [Chills] : no chills [Nausea] : no nausea [Constipation] : no constipation [Diarrhea] : diarrhea [Headache] : no headache [Dizziness] : no dizziness [Fainting] : no fainting [FreeTextEntry2] : As above [de-identified] : Alopecia  [de-identified] : Brain fog s/p COVID-19 [de-identified] : As above

## 2021-07-16 NOTE — PHYSICAL EXAM
[Normal Sclera/Conjunctiva] : normal sclera/conjunctiva [PERRL] : pupils equal round and reactive to light [Pedal Pulses Present] : the pedal pulses are present [No Edema] : there was no peripheral edema [No Palpable Aorta] : no palpable aorta [No Extremity Clubbing/Cyanosis] : no extremity clubbing/cyanosis [Normal Supraclavicular Nodes] : no supraclavicular lymphadenopathy [Normal Posterior Cervical Nodes] : no posterior cervical lymphadenopathy [Normal Anterior Cervical Nodes] : no anterior cervical lymphadenopathy [Speech Grossly Normal] : speech grossly normal [Alert and Oriented x3] : oriented to person, place, and time [Normal] : affect was normal and insight and judgment were intact [de-identified] : Obese

## 2021-07-17 ENCOUNTER — RX RENEWAL (OUTPATIENT)
Age: 36
End: 2021-07-17

## 2021-07-17 LAB
25(OH)D3 SERPL-MCNC: 25.5 NG/ML
ALBUMIN SERPL ELPH-MCNC: 4.4 G/DL
ALP BLD-CCNC: 59 U/L
ALT SERPL-CCNC: 11 U/L
ANION GAP SERPL CALC-SCNC: 13 MMOL/L
APPEARANCE: CLEAR
AST SERPL-CCNC: 16 U/L
BASOPHILS # BLD AUTO: 0.03 K/UL
BASOPHILS NFR BLD AUTO: 0.6 %
BILIRUB SERPL-MCNC: 0.2 MG/DL
BILIRUBIN URINE: NEGATIVE
BLOOD URINE: NEGATIVE
BUN SERPL-MCNC: 18 MG/DL
CALCIUM SERPL-MCNC: 9.2 MG/DL
CHLORIDE SERPL-SCNC: 108 MMOL/L
CHOLEST SERPL-MCNC: 217 MG/DL
CO2 SERPL-SCNC: 20 MMOL/L
COLOR: YELLOW
CREAT SERPL-MCNC: 1.06 MG/DL
EOSINOPHIL # BLD AUTO: 0.07 K/UL
EOSINOPHIL NFR BLD AUTO: 1.5 %
ESTIMATED AVERAGE GLUCOSE: 131 MG/DL
GLUCOSE QUALITATIVE U: NEGATIVE
GLUCOSE SERPL-MCNC: 102 MG/DL
HBA1C MFR BLD HPLC: 6.2 %
HCT VFR BLD CALC: 40.2 %
HDLC SERPL-MCNC: 58 MG/DL
HGB BLD-MCNC: 11.8 G/DL
IMM GRANULOCYTES NFR BLD AUTO: 0.2 %
KETONES URINE: NEGATIVE
LDLC SERPL CALC-MCNC: 138 MG/DL
LEUKOCYTE ESTERASE URINE: NEGATIVE
LYMPHOCYTES # BLD AUTO: 2.15 K/UL
LYMPHOCYTES NFR BLD AUTO: 45 %
MAN DIFF?: NORMAL
MCHC RBC-ENTMCNC: 25.4 PG
MCHC RBC-ENTMCNC: 29.4 GM/DL
MCV RBC AUTO: 86.6 FL
MONOCYTES # BLD AUTO: 0.34 K/UL
MONOCYTES NFR BLD AUTO: 7.1 %
NEUTROPHILS # BLD AUTO: 2.18 K/UL
NEUTROPHILS NFR BLD AUTO: 45.6 %
NITRITE URINE: NEGATIVE
NONHDLC SERPL-MCNC: 159 MG/DL
PH URINE: 6
PLATELET # BLD AUTO: 286 K/UL
POTASSIUM SERPL-SCNC: 4 MMOL/L
PROT SERPL-MCNC: 7 G/DL
PROTEIN URINE: NORMAL
RBC # BLD: 4.64 M/UL
RBC # FLD: 16.7 %
SODIUM SERPL-SCNC: 141 MMOL/L
SPECIFIC GRAVITY URINE: 1.02
TRIGL SERPL-MCNC: 107 MG/DL
TSH SERPL-ACNC: 1.78 UIU/ML
UROBILINOGEN URINE: NORMAL
WBC # FLD AUTO: 4.78 K/UL

## 2021-11-22 RX ORDER — HYDROCHLOROTHIAZIDE 25 MG/1
25 TABLET ORAL
Qty: 90 | Refills: 3 | Status: ACTIVE | COMMUNITY
Start: 2021-04-30 | End: 1900-01-01

## 2021-11-23 RX ORDER — FAMOTIDINE 40 MG/1
40 TABLET, FILM COATED ORAL DAILY
Qty: 90 | Refills: 3 | Status: ACTIVE | COMMUNITY
Start: 2018-07-16 | End: 1900-01-01

## 2021-11-24 RX ORDER — HYDROCORTISONE 25 MG/G
2.5 CREAM TOPICAL 3 TIMES DAILY
Qty: 1 | Refills: 4 | Status: ACTIVE | COMMUNITY
Start: 2021-11-24 | End: 1900-01-01

## 2024-06-24 ENCOUNTER — NON-APPOINTMENT (OUTPATIENT)
Age: 39
End: 2024-06-24

## 2024-06-25 ENCOUNTER — LABORATORY RESULT (OUTPATIENT)
Age: 39
End: 2024-06-25

## 2024-06-25 ENCOUNTER — APPOINTMENT (OUTPATIENT)
Dept: INTERNAL MEDICINE | Facility: CLINIC | Age: 39
End: 2024-06-25
Payer: COMMERCIAL

## 2024-06-25 VITALS
HEART RATE: 66 BPM | BODY MASS INDEX: 37.73 KG/M2 | OXYGEN SATURATION: 100 % | DIASTOLIC BLOOD PRESSURE: 86 MMHG | SYSTOLIC BLOOD PRESSURE: 126 MMHG | TEMPERATURE: 98.5 F | HEIGHT: 64 IN | WEIGHT: 221 LBS

## 2024-06-25 DIAGNOSIS — R42 DIZZINESS AND GIDDINESS: ICD-10-CM

## 2024-06-25 DIAGNOSIS — Z00.00 ENCOUNTER FOR GENERAL ADULT MEDICAL EXAMINATION W/OUT ABNORMAL FINDINGS: ICD-10-CM

## 2024-06-25 DIAGNOSIS — Z11.3 ENCOUNTER FOR SCREENING FOR INFECTIONS WITH A PREDOMINANTLY SEXUAL MODE OF TRANSMISSION: ICD-10-CM

## 2024-06-25 DIAGNOSIS — D64.9 ANEMIA, UNSPECIFIED: ICD-10-CM

## 2024-06-25 DIAGNOSIS — F41.9 ANXIETY DISORDER, UNSPECIFIED: ICD-10-CM

## 2024-06-25 DIAGNOSIS — G89.29 HEADACHE, UNSPECIFIED: ICD-10-CM

## 2024-06-25 DIAGNOSIS — R51.9 HEADACHE, UNSPECIFIED: ICD-10-CM

## 2024-06-25 DIAGNOSIS — Z23 ENCOUNTER FOR IMMUNIZATION: ICD-10-CM

## 2024-06-25 DIAGNOSIS — Z87.898 PERSONAL HISTORY OF OTHER SPECIFIED CONDITIONS: ICD-10-CM

## 2024-06-25 DIAGNOSIS — R53.83 OTHER FATIGUE: ICD-10-CM

## 2024-06-25 DIAGNOSIS — R06.09 OTHER FORMS OF DYSPNEA: ICD-10-CM

## 2024-06-25 DIAGNOSIS — R73.03 PREDIABETES.: ICD-10-CM

## 2024-06-25 DIAGNOSIS — Z92.29 PERSONAL HISTORY OF OTHER DRUG THERAPY: ICD-10-CM

## 2024-06-25 DIAGNOSIS — Z12.39 ENCOUNTER FOR OTHER SCREENING FOR MALIGNANT NEOPLASM OF BREAST: ICD-10-CM

## 2024-06-25 PROCEDURE — 99396 PREV VISIT EST AGE 40-64: CPT

## 2024-06-25 PROCEDURE — 36415 COLL VENOUS BLD VENIPUNCTURE: CPT

## 2024-06-25 RX ORDER — BUPROPION HYDROCHLORIDE 150 MG/1
150 TABLET, EXTENDED RELEASE ORAL
Qty: 90 | Refills: 0 | Status: ACTIVE | COMMUNITY
Start: 2024-06-25

## 2024-06-25 RX ORDER — ACETAZOLAMIDE 250 MG/1
TABLET ORAL
Refills: 0 | Status: ACTIVE | COMMUNITY

## 2024-06-26 ENCOUNTER — TRANSCRIPTION ENCOUNTER (OUTPATIENT)
Age: 39
End: 2024-06-26

## 2024-06-26 LAB
HCV AB SER QL: NONREACTIVE
HCV S/CO RATIO: 0.07 S/CO
T PALLIDUM AB SER QL IA: NEGATIVE
TSH SERPL-ACNC: 1.3 UIU/ML

## 2024-06-27 ENCOUNTER — TRANSCRIPTION ENCOUNTER (OUTPATIENT)
Age: 39
End: 2024-06-27

## 2024-06-27 DIAGNOSIS — U07.1 COVID-19: ICD-10-CM

## 2024-06-27 DIAGNOSIS — R92.30 DENSE BREASTS, UNSPECIFIED: ICD-10-CM

## 2024-06-27 LAB
ALBUMIN SERPL ELPH-MCNC: 4.5 G/DL
ALP BLD-CCNC: 52 U/L
ALT SERPL-CCNC: 9 U/L
ANION GAP SERPL CALC-SCNC: 10 MMOL/L
AST SERPL-CCNC: 14 U/L
BASOPHILS # BLD AUTO: 0.02 K/UL
BASOPHILS NFR BLD AUTO: 0.4 %
BILIRUB SERPL-MCNC: 0.2 MG/DL
BUN SERPL-MCNC: 18 MG/DL
CALCIUM SERPL-MCNC: 9.3 MG/DL
CHLORIDE SERPL-SCNC: 107 MMOL/L
CHOLEST SERPL-MCNC: 224 MG/DL
CO2 SERPL-SCNC: 22 MMOL/L
CREAT SERPL-MCNC: 0.95 MG/DL
EGFR: 78 ML/MIN/1.73M2
EOSINOPHIL # BLD AUTO: 0.04 K/UL
EOSINOPHIL NFR BLD AUTO: 0.9 %
ESTIMATED AVERAGE GLUCOSE: 123 MG/DL
FERRITIN SERPL-MCNC: 6 NG/ML
GLUCOSE SERPL-MCNC: 109 MG/DL
HBA1C MFR BLD HPLC: 5.9 %
HCT VFR BLD CALC: 33.4 %
HDLC SERPL-MCNC: 73 MG/DL
HGB BLD-MCNC: 9.7 G/DL
HIV1+2 AB SPEC QL IA.RAPID: REACTIVE
IMM GRANULOCYTES NFR BLD AUTO: 0.2 %
IRON SATN MFR SERPL: 5 %
IRON SERPL-MCNC: 23 UG/DL
LDLC SERPL CALC-MCNC: 141 MG/DL
LYMPHOCYTES # BLD AUTO: 1.83 K/UL
LYMPHOCYTES NFR BLD AUTO: 39.5 %
MAN DIFF?: NORMAL
MCHC RBC-ENTMCNC: 21.9 PG
MCHC RBC-ENTMCNC: 29 GM/DL
MCV RBC AUTO: 75.4 FL
MONOCYTES # BLD AUTO: 0.33 K/UL
MONOCYTES NFR BLD AUTO: 7.1 %
NEUTROPHILS # BLD AUTO: 2.4 K/UL
NEUTROPHILS NFR BLD AUTO: 51.9 %
NONHDLC SERPL-MCNC: 151 MG/DL
PLATELET # BLD AUTO: 402 K/UL
POTASSIUM SERPL-SCNC: 4 MMOL/L
PROT SERPL-MCNC: 7 G/DL
RBC # BLD: 4.43 M/UL
RBC # FLD: 18 %
SODIUM SERPL-SCNC: 139 MMOL/L
TIBC SERPL-MCNC: 436 UG/DL
TRIGL SERPL-MCNC: 60 MG/DL
UIBC SERPL-MCNC: 413 UG/DL
WBC # FLD AUTO: 4.63 K/UL

## 2024-07-08 ENCOUNTER — TRANSCRIPTION ENCOUNTER (OUTPATIENT)
Age: 39
End: 2024-07-08

## 2024-07-16 ENCOUNTER — TRANSCRIPTION ENCOUNTER (OUTPATIENT)
Age: 39
End: 2024-07-16

## 2024-08-08 ENCOUNTER — TRANSCRIPTION ENCOUNTER (OUTPATIENT)
Age: 39
End: 2024-08-08

## 2024-08-08 ENCOUNTER — APPOINTMENT (OUTPATIENT)
Dept: ULTRASOUND IMAGING | Facility: CLINIC | Age: 39
End: 2024-08-08

## 2024-08-08 ENCOUNTER — RESULT REVIEW (OUTPATIENT)
Age: 39
End: 2024-08-08

## 2024-08-08 PROCEDURE — 76641 ULTRASOUND BREAST COMPLETE: CPT | Mod: 50

## 2024-11-11 ENCOUNTER — TRANSCRIPTION ENCOUNTER (OUTPATIENT)
Age: 39
End: 2024-11-11